# Patient Record
Sex: FEMALE | Race: BLACK OR AFRICAN AMERICAN | Employment: FULL TIME | URBAN - METROPOLITAN AREA
[De-identification: names, ages, dates, MRNs, and addresses within clinical notes are randomized per-mention and may not be internally consistent; named-entity substitution may affect disease eponyms.]

---

## 2019-11-25 PROBLEM — E11.9 CONTROLLED TYPE 2 DIABETES MELLITUS WITHOUT COMPLICATION, WITHOUT LONG-TERM CURRENT USE OF INSULIN (HCC): Status: ACTIVE | Noted: 2019-11-25

## 2019-11-25 PROBLEM — B37.9 YEAST INFECTION: Status: ACTIVE | Noted: 2019-11-25

## 2019-11-25 PROBLEM — F32.9 MAJOR DEPRESSIVE DISORDER: Status: ACTIVE | Noted: 2019-11-25

## 2019-11-25 PROBLEM — F51.01 PRIMARY INSOMNIA: Status: ACTIVE | Noted: 2019-11-25

## 2019-11-25 PROBLEM — Z98.890 HISTORY OF PARATHYROID SURGERY: Status: ACTIVE | Noted: 2019-11-25

## 2019-11-25 PROBLEM — Z01.419 WOMEN'S ANNUAL ROUTINE GYNECOLOGICAL EXAMINATION: Status: ACTIVE | Noted: 2019-11-25

## 2019-12-25 PROBLEM — Z01.419 WOMEN'S ANNUAL ROUTINE GYNECOLOGICAL EXAMINATION: Status: RESOLVED | Noted: 2019-11-25 | Resolved: 2019-12-25

## 2020-01-17 PROBLEM — Z98.890 HISTORY OF PARATHYROID SURGERY: Status: RESOLVED | Noted: 2019-11-25 | Resolved: 2020-01-17

## 2020-02-02 PROBLEM — Z80.0 FAMILY HISTORY OF COLON CANCER: Status: ACTIVE | Noted: 2020-02-02

## 2020-03-17 PROBLEM — M25.50 MULTIPLE JOINT PAIN: Status: ACTIVE | Noted: 2020-03-17

## 2020-09-01 PROBLEM — I10 ESSENTIAL HYPERTENSION: Status: ACTIVE | Noted: 2020-09-01

## 2020-12-16 ENCOUNTER — HOSPITAL ENCOUNTER (OUTPATIENT)
Dept: MAMMOGRAPHY | Age: 45
Discharge: HOME | End: 2020-12-16
Attending: OBSTETRICS & GYNECOLOGY
Payer: COMMERCIAL

## 2020-12-16 DIAGNOSIS — R92.2 DENSE BREAST TISSUE: ICD-10-CM

## 2020-12-16 DIAGNOSIS — Z12.39 SCREENING FOR BREAST CANCER: ICD-10-CM

## 2020-12-16 PROCEDURE — 77063 BREAST TOMOSYNTHESIS BI: CPT

## 2024-08-26 ENCOUNTER — LAB OUTSIDE AN ENCOUNTER (OUTPATIENT)
Dept: URBAN - METROPOLITAN AREA CLINIC 98 | Facility: CLINIC | Age: 49
End: 2024-08-26

## 2024-08-26 ENCOUNTER — OFFICE VISIT (OUTPATIENT)
Dept: URBAN - METROPOLITAN AREA CLINIC 98 | Facility: CLINIC | Age: 49
End: 2024-08-26
Payer: COMMERCIAL

## 2024-08-26 ENCOUNTER — DASHBOARD ENCOUNTERS (OUTPATIENT)
Age: 49
End: 2024-08-26

## 2024-08-26 VITALS
BODY MASS INDEX: 25.78 KG/M2 | DIASTOLIC BLOOD PRESSURE: 97 MMHG | WEIGHT: 151 LBS | HEIGHT: 64 IN | SYSTOLIC BLOOD PRESSURE: 147 MMHG | TEMPERATURE: 97.5 F | HEART RATE: 96 BPM

## 2024-08-26 DIAGNOSIS — Z12.11 COLON CANCER SCREENING: ICD-10-CM

## 2024-08-26 DIAGNOSIS — Z79.85 LONG-TERM CURRENT USE OF INJECTABLE NONINSULIN ANTIDIABETIC MEDICATION: ICD-10-CM

## 2024-08-26 DIAGNOSIS — K76.9 LIVER LESION: ICD-10-CM

## 2024-08-26 DIAGNOSIS — R12 HEARTBURN: ICD-10-CM

## 2024-08-26 PROBLEM — 300331000: Status: ACTIVE | Noted: 2024-08-26

## 2024-08-26 PROCEDURE — 99203 OFFICE O/P NEW LOW 30 MIN: CPT | Performed by: INTERNAL MEDICINE

## 2024-08-26 RX ORDER — PANTOPRAZOLE SODIUM 40 MG/1
1 TABLET TABLET, DELAYED RELEASE ORAL ONCE A DAY
Qty: 90 TABLET | Refills: 3 | OUTPATIENT
Start: 2024-08-26

## 2024-08-26 NOTE — HPI-TODAY'S VISIT:
Here to discuss pain in abdomen x 3 months : piercing and dull ; also chronic cough despite abx for bronchitis  episodes days - then restriction on foods will make it stop triggers : tomato based food - acid regurgitation  triggers : bedtime 2 hr after eating  went to GYN : no issues  PCP labs 4/2024 : tbili 1.3 alp 55 ast 26 alt 8  hb 13.2 plt 328  CT [facility] 4/2024 : multiple too small hepatic lesions similar to prior exam 0.5 cm  renal calculus left, non obstructing   metformin gives her diarrhea  ozempic x 1 year : severe constipation with higher doses

## 2024-10-23 ENCOUNTER — OFFICE VISIT (OUTPATIENT)
Dept: URBAN - METROPOLITAN AREA SURGERY CENTER 18 | Facility: SURGERY CENTER | Age: 49
End: 2024-10-23

## 2024-10-23 ENCOUNTER — CLAIMS CREATED FROM THE CLAIM WINDOW (OUTPATIENT)
Dept: URBAN - METROPOLITAN AREA SURGERY CENTER 18 | Facility: SURGERY CENTER | Age: 49
End: 2024-10-23
Payer: COMMERCIAL

## 2024-10-23 DIAGNOSIS — D12.3 ADENOMATOUS POLYP OF TRANSVERSE COLON: ICD-10-CM

## 2024-10-23 DIAGNOSIS — D12.5 ADENOMATOUS POLYP OF SIGMOID COLON: ICD-10-CM

## 2024-10-23 DIAGNOSIS — Z12.11 COLON CANCER SCREENING (HIGH RISK): ICD-10-CM

## 2024-10-23 DIAGNOSIS — K29.70 GASTRITIS: ICD-10-CM

## 2024-10-23 DIAGNOSIS — K57.30 DIVERTICULA OF COLON: ICD-10-CM

## 2024-10-23 PROCEDURE — 00813 ANES UPR LWR GI NDSC PX: CPT | Performed by: NURSE ANESTHETIST, CERTIFIED REGISTERED

## 2024-10-23 RX ORDER — PANTOPRAZOLE SODIUM 40 MG/1
1 TABLET TABLET, DELAYED RELEASE ORAL ONCE A DAY
Qty: 90 TABLET | Refills: 3 | Status: ACTIVE | COMMUNITY
Start: 2024-08-26

## 2024-10-25 ENCOUNTER — TELEPHONE ENCOUNTER (OUTPATIENT)
Dept: URBAN - METROPOLITAN AREA CLINIC 98 | Facility: CLINIC | Age: 49
End: 2024-10-25

## 2024-10-29 ENCOUNTER — OFFICE VISIT (OUTPATIENT)
Dept: URBAN - METROPOLITAN AREA CLINIC 98 | Facility: CLINIC | Age: 49
End: 2024-10-29
Payer: COMMERCIAL

## 2024-10-29 VITALS
HEIGHT: 65 IN | BODY MASS INDEX: 25.56 KG/M2 | HEART RATE: 81 BPM | TEMPERATURE: 97.1 F | SYSTOLIC BLOOD PRESSURE: 129 MMHG | DIASTOLIC BLOOD PRESSURE: 80 MMHG | WEIGHT: 153.4 LBS

## 2024-10-29 DIAGNOSIS — A04.8 HELICOBACTER PYLORI INFECTION: ICD-10-CM

## 2024-10-29 DIAGNOSIS — Z86.0101 PERSONAL HISTORY OF ADENOMATOUS AND SERRATED COLON POLYPS: ICD-10-CM

## 2024-10-29 DIAGNOSIS — Z79.85 LONG-TERM CURRENT USE OF INJECTABLE NONINSULIN ANTIDIABETIC MEDICATION: ICD-10-CM

## 2024-10-29 DIAGNOSIS — R12 HEARTBURN: ICD-10-CM

## 2024-10-29 PROCEDURE — 99214 OFFICE O/P EST MOD 30 MIN: CPT

## 2024-10-29 RX ORDER — CLARITHROMYCIN 500 MG/1
1 TABLET TABLET, FILM COATED ORAL
Qty: 28 TABLET | Refills: 0 | OUTPATIENT
Start: 2024-10-29 | End: 2024-11-11

## 2024-10-29 RX ORDER — PANTOPRAZOLE SODIUM 40 MG/1
1 TABLET TABLET, DELAYED RELEASE ORAL ONCE A DAY
Qty: 90 TABLET | Refills: 3 | Status: ACTIVE | COMMUNITY
Start: 2024-08-26

## 2024-10-29 RX ORDER — METRONIDAZOLE 500 MG/1
1 TABLET TABLET ORAL TWICE A DAY
Qty: 28 | Refills: 0 | OUTPATIENT
Start: 2024-10-29 | End: 2024-11-11

## 2024-10-29 NOTE — HPI-TODAY'S VISIT:
8/26/24- Dr. Pratt Here to discuss pain in abdomen x 3 months : piercing and dull ; also chronic cough despite abx for bronchitis  episodes days - then restriction on foods will make it stop triggers : tomato based food - acid regurgitation  triggers : bedtime 2 hr after eating  went to GYN : no issues  PCP labs 4/2024 : tbili 1.3 alp 55 ast 26 alt 8  hb 13.2 plt 328  CT [facility] 4/2024 : multiple too small hepatic lesions similar to prior exam 0.5 cm  renal calculus left, non obstructing  metformin gives her diarrhea  ozempic x 1 year : severe constipation with higher doses  10/29/24- Mihaela Hobson, NP - 48 yo female here to follow-up after procedures with GERD - PCP Dr. Hunter Real -Colonoscopy 10/23/2024-diverticula in the right colon.  3 mm polyp in the sigmoid colon polyp.  3 mm polyp in the transverse colon.  Otherwise exam appeared normal.  Biopsies transverse polyp-tubular adenoma.  Biopsy sigmoid polyp-hyperplastic.  Recommend repeat colonoscopy 5 years. -EGD 10/23/2024-normal mucosa in the upper third of esophagus.  Irregular appearing Z-line at GE junction.  Small hiatal hernia.  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  Duodenum appeared normal.  Biopsy duodenum-no significant abnormality.  Biopsies stomach body and antrum-chronic active gastritis, positive H. pylori.  Biopsy GE junction-chronic active inflammation, no intestinal metaplasia identified.  No Reyes's esophagus.  Biopsy of upper third of esophagus-no significant abnormality. - Last visit started on pantoprazole 40 mg daily for heartburn - heartburn symptoms have improved with PPI

## 2024-12-10 ENCOUNTER — LAB OUTSIDE AN ENCOUNTER (OUTPATIENT)
Dept: URBAN - METROPOLITAN AREA CLINIC 98 | Facility: CLINIC | Age: 49
End: 2024-12-10

## 2024-12-12 ENCOUNTER — LAB OUTSIDE AN ENCOUNTER (OUTPATIENT)
Dept: URBAN - METROPOLITAN AREA CLINIC 98 | Facility: CLINIC | Age: 49
End: 2024-12-12

## 2024-12-14 LAB
H PYLORI BREATH TEST: NEGATIVE
H. PYLORI BREATH COLLECTION: (no result)

## 2024-12-17 ENCOUNTER — TELEPHONE ENCOUNTER (OUTPATIENT)
Dept: URBAN - METROPOLITAN AREA CLINIC 3 | Facility: CLINIC | Age: 49
End: 2024-12-17

## 2024-12-20 ENCOUNTER — OFFICE VISIT (OUTPATIENT)
Dept: URBAN - METROPOLITAN AREA TELEHEALTH 2 | Facility: TELEHEALTH | Age: 49
End: 2024-12-20